# Patient Record
(demographics unavailable — no encounter records)

---

## 2025-04-16 NOTE — HEALTH RISK ASSESSMENT
[No] : In the past 12 months have you used drugs other than those required for medical reasons? No [No falls in past year] : Patient reported no falls in the past year [0] : 2) Feeling down, depressed, or hopeless: Not at all (0) [Never] : Never [Patient reported PAP Smear was normal] : Patient reported PAP Smear was normal [HIV Test offered] : HIV Test offered [Hepatitis C test offered] : Hepatitis C test offered [None] : None [With Family] : lives with family [Feels Safe at Home] : Feels safe at home [Fully functional (bathing, dressing, toileting, transferring, walking, feeding)] : Fully functional (bathing, dressing, toileting, transferring, walking, feeding) [Fully functional (using the telephone, shopping, preparing meals, housekeeping, doing laundry, using] : Fully functional and needs no help or supervision to perform IADLs (using the telephone, shopping, preparing meals, housekeeping, doing laundry, using transportation, managing medications and managing finances) [PapSmearDate] : 2024 [FreeTextEntry2] : works as a , her dad is MA for pulm clinic at   [de-identified] : walking everyday  [de-identified] : healthy diet [[id number]] : 0

## 2025-04-16 NOTE — END OF VISIT
[FreeTextEntry3] :  45 minutes of total time was spent on the date of the encounter. This included time for review of medical records and laboratory results, face to face time (including the physical examination counseling and coordination of care), time for patient education, treatment plans, answering questions, communicating with other doctors and for medical record documentation.  The time spent is separate from time spent on separately billed procedures. [Time Spent: ___ minutes] : I have spent [unfilled] minutes of time on the encounter which excludes teaching and separately reported services.

## 2025-04-16 NOTE — HISTORY OF PRESENT ILLNESS
[FreeTextEntry8] : Pt is a 26 y/o F w/ a PMH of HLD, tension HA is here to discuss about the recent HA episode and annual physical. Pt reported 2 weeks ago patient had throbbing unilateral right sided HA which lasted more than 48 hrs this time around, previously her HA would last less than a day. Pt can't remember any specific trigger but noticed that she has HA that gets worse when she is dehydrated. Last migraine episode was several years ago which was controlled on Tylenol and ibuprofen. Pt denies flashing lights, visual changs, thunderclap HA, motor or sensory changes.

## 2025-04-16 NOTE — ASSESSMENT
[FreeTextEntry1] : Pt is a 24 y/o F w/ a PMH of HLD, tension HA is here to discuss about the recent HA episode and annual physical.   # Migraine  We discussed the use of dietary therapy and lifestyle modifications in the management of migraines/headaches Tylenol/ Ibuprofen q8hrs prn during an episode  Sumatriptan daily prn during an attack   #  HEALTHCARE MAINTENANCE Follow up CBC, CMP Hba1c Lipid panel TSH HIV  # Annual Screening:  Referral to gyn for pap smear: utd   # Lifestyle modifications and patient Education: Counseling on safe sex practices Avoid cigarette smoking  Avoid drinking alcohol  No illicit drug use